# Patient Record
Sex: MALE | Race: WHITE
[De-identification: names, ages, dates, MRNs, and addresses within clinical notes are randomized per-mention and may not be internally consistent; named-entity substitution may affect disease eponyms.]

---
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
## 2025-01-29 ENCOUNTER — HOSPITAL ENCOUNTER (OUTPATIENT)
Dept: HOSPITAL 47 - 3 N SLEEP | Age: 20
End: 2025-01-29
Payer: COMMERCIAL

## 2025-01-29 VITALS
DIASTOLIC BLOOD PRESSURE: 73 MMHG | RESPIRATION RATE: 18 BRPM | SYSTOLIC BLOOD PRESSURE: 109 MMHG | HEART RATE: 125 BPM | TEMPERATURE: 98.5 F

## 2025-01-29 DIAGNOSIS — Q23.81: ICD-10-CM

## 2025-01-29 DIAGNOSIS — Z86.39: ICD-10-CM

## 2025-01-29 DIAGNOSIS — E66.9: ICD-10-CM

## 2025-01-29 DIAGNOSIS — G47.33: Primary | ICD-10-CM

## 2025-01-29 DIAGNOSIS — R06.83: ICD-10-CM

## 2025-01-29 DIAGNOSIS — R53.83: ICD-10-CM

## 2025-01-29 PROCEDURE — 99211 OFF/OP EST MAY X REQ PHY/QHP: CPT

## 2025-01-29 NOTE — P.SLEEP
History of Present Illness


DATE: 1/29/2025





CONSULTATION/NEW PATIENT EVALUATION





HISTORY OF PRESENT ILLNESS/SLEEP-WAKE EVALUATION:   19-year-old gentleman had b

een evaluated in the sleep center for possible obstructive sleep apnea hypopnea 

syndrome.





SLEEP SCHEDULE: Usually sleep schedule from 11:30 PM to 6:30 AM on weekdays and 

until around 8 AM on weekend.





FALLING ASLEEP: No problems with falling asleep.





DURING SLEEP: Patient sleeps by himself so no information about snoring.  No 

history of hypnogogical hallucinations, sleep paralysis, or cataplexy.





DURING THE DAY/WAKE STATE: In the morning patient wake up tired.  [place] 

sleepiness scale is 9.  Usually patient does not take naps.





PAST MEDICAL HISTORY:   Bicuspid aortic valve, hyperlipidemia.





PAST SURGICAL HISTORY: None.





MEDICATIONS:   None.





SOCIAL HISTORY:   Please see below.





FAMILY HISTORY: Snoring, diabetes.





REVIEW OF SYSTEMS: Tiredness and sleepiness during the day. No fevers. No double

vision. No recent chest pain. No shortness of breath. No abdominal pain. No 

bleeding episodes. No blood in urine. No seizure episodes. 





PHYSICAL EXAMINATION: 


GENERAL: A pleasant patient without any distress. 


VITAL SIGNS: Please see below, weight 255 pounds, BMI 36.9.


HEENT: PERRLA, EOMI. Evaluation of oropharynx showed tongue protrudes midline, 

low position of soft palate Mallampati 23.


NECK: Supple. No JVD. Thyroid is not palpable.   19-1/4 inches in circumference.


LUNGS: Clear to percussion and to auscultation. Good air exchange. No wheezing 

or rhonchi. 


HEART: S1, S2 regular. No murmurs, gallops or rubs. 


ABDOMEN: Soft and nontender. Bowel sounds are present. No organomegaly 

appreciated. 


EXTREMITIES: No clubbing or cyanosis. 


CNS: Awake, alert, and oriented x3. Cranial nerves 2 to 7 intact. There is no 

fasciculation or atrophy noted. No focal deficits observed. 





ASSESSMENT:


1.  Patient sleeps by himself, no information about snoring.  Tiredness and 

sleepiness during the day.  Wide neck 19-1/4 inches in circumference.  

Obstructive sleep apnea hypopnea syndrome.


2.  Obesity, BMI 36.9.


3.  History of hyperlipidemia.


4.  Bicuspid aortic valve.





PLAN: 


1.   Home sleep apnea test  for evaluation of patient's breathing during sleep. 


2.   Following plan after reading sleep study. 


3.   Preferable position during sleep on the side. 


4.   No driving if patient feels any sleepiness. Patient is aware of civil and 

criminal liability for unsafe driving. 


5.                Sleep hygiene with regular sleep time for at least 7.5-8 

hours.


6.   Watching and losing weight. 





Thank you very much for referring this patient for consultation.





Sincerely,











Morgan Estevez MD, PhD, FAASM.


Diplomat of American Board of Sleep Medicine,


Sleep Medicine Board by American Board of Medical Specialities


American Board of Internal Medicine


Medical Director of [facility]











cc: Alaina Matthews DO





Past Medical History


Past Medical History: Hyperlipidemia


Additional Past Medical History / Comment(s): Bicuspid aortic valve since birth


History of Any Multi-Drug Resistant Organisms: None Reported


Past Surgical History: No Surgical Hx Reported


Additional Psychological History / Comment(s): "I've bee told that I probably 

have ADHD but I never actually had it checked out."


Smoking Status: Never smoker


Past Alcohol Use History: None Reported


Past Drug Use History: None Reported





- Past Family History


  ** Mother


Additional Family Medical History / Comment(s): Snoring





Physical Exam


Vitals: 


                                   Vital Signs











  Temp Pulse Resp BP Pulse Ox


 


 01/29/25 13:21  98.5 F  125 H  18  109/73  97








                                Intake and Output











 01/28/25 01/29/25 01/29/25





 22:59 06:59 14:59


 


Other:   


 


  Weight   115.666 kg














Sleep Note





- Sleep Data


ESS Total: 9





- Sleep Note


Sleep Note: 


Temperature: 98.5 F


Pulse Rate: 125


Respiratory Rate: 18


Blood Pressure: 109/73


SpO2: 97


Height: 5 ft 9.7 in


Weight: 115.666 kg


BMI: 


Neck Circumference: 19.2

## 2025-03-21 ENCOUNTER — HOSPITAL ENCOUNTER (OUTPATIENT)
Dept: HOSPITAL 47 - 3 N SLEEP | Age: 20
End: 2025-03-21
Payer: COMMERCIAL

## 2025-03-21 DIAGNOSIS — G47.33: Primary | ICD-10-CM

## 2025-03-26 NOTE — P.PCN
Description of Procedure: 


CLINICAL:





A home sleep apnea test has been done for confirmation of possible obstructive 

sleep apnea-hypopnea syndrome.





DESCRIPTION OF PROCEDURE:


RESULTS:


Recording time was 8 hours 18 minutes.


Evaluation time was 8 hours 6 minutes.


Evaluation time is sufficient for making conclusion about results of the test.


Raw data of sleep recording has been reviewed and is adequate.


Respiratory channel showed 10 apneas and 49 hypopneas.


Apnea-hypopnea index was 7.3 per hour.


 Pulse rate in the range between minimum 48, maximum 119, average 60 by computer

calculation. Lowest desaturation was 86%.





IMPRESSION:


1. Obstructive Sleep Apnea Hypopnea Syndrome with symptoms of excessive daytime 

sleepiness.








Please see other impressions from consultation.











PLAN:





1.  I will see patient for follow-up visit to discuss results of the test and 

recommendations.





2. Sleep hygiene with regular time in bed for at least 8 hours.





3. Watching weight.





4. No driving if feeling any sleepiness.











Thank you very much for allowing me to participate in the management of your 

patient.





Sincerely,





Morgan Estevez MD, PhD, FAASM





Diplomat of American Board of Medical Specialties





Sleep Medicine Board of American Board of Internal Medicine





Medical Director of [facility]











cc: Tristan Matthews DO

## 2025-05-12 ENCOUNTER — HOSPITAL ENCOUNTER (OUTPATIENT)
Dept: HOSPITAL 47 - 3 N SLEEP | Age: 20
End: 2025-05-12
Payer: COMMERCIAL

## 2025-05-12 VITALS
RESPIRATION RATE: 16 BRPM | SYSTOLIC BLOOD PRESSURE: 109 MMHG | TEMPERATURE: 98.3 F | DIASTOLIC BLOOD PRESSURE: 72 MMHG | HEART RATE: 90 BPM

## 2025-05-12 DIAGNOSIS — E66.9: ICD-10-CM

## 2025-05-12 DIAGNOSIS — G47.33: Primary | ICD-10-CM

## 2025-05-12 DIAGNOSIS — Q23.81: ICD-10-CM

## 2025-05-12 DIAGNOSIS — Z86.39: ICD-10-CM

## 2025-05-12 DIAGNOSIS — R00.0: ICD-10-CM

## 2025-05-12 PROCEDURE — 99212 OFFICE O/P EST SF 10 MIN: CPT
